# Patient Record
Sex: FEMALE | Race: WHITE
[De-identification: names, ages, dates, MRNs, and addresses within clinical notes are randomized per-mention and may not be internally consistent; named-entity substitution may affect disease eponyms.]

---

## 2019-06-24 ENCOUNTER — HOSPITAL ENCOUNTER (EMERGENCY)
Dept: HOSPITAL 74 - FER | Age: 29
Discharge: HOME | End: 2019-06-24
Payer: SELF-PAY

## 2020-01-15 ENCOUNTER — HOSPITAL ENCOUNTER (EMERGENCY)
Dept: HOSPITAL 74 - FER | Age: 30
Discharge: HOME | End: 2020-01-15
Payer: COMMERCIAL

## 2020-01-15 VITALS — BODY MASS INDEX: 31.1 KG/M2

## 2020-01-15 VITALS — DIASTOLIC BLOOD PRESSURE: 76 MMHG | HEART RATE: 94 BPM | TEMPERATURE: 99.8 F | SYSTOLIC BLOOD PRESSURE: 116 MMHG

## 2020-01-15 DIAGNOSIS — J06.9: Primary | ICD-10-CM

## 2020-01-15 DIAGNOSIS — R05: ICD-10-CM

## 2020-01-15 DIAGNOSIS — B97.89: ICD-10-CM

## 2020-01-15 DIAGNOSIS — F17.210: ICD-10-CM

## 2020-01-15 NOTE — PDOC
History of Present Illness





- General


Chief Complaint: Cold Symptoms


Stated Complaint: COUGH


Time Seen by Provider: 01/15/20 09:45





- History of Present Illness


Initial Comments: 





01/15/20 12:07


Chief complaint: Fever, body aches, malaise, fatigue, nonproductive cough, 

nausea, vomiting, diarrhea for 3 days.  Taking over-the-counter flu remedies





HPI: As above.





Review of systems: No hematemesis, melena, bloody stool.  Holding down p.o. 

food and fluids.   with similar illness which has resolved.  No flu shot 

was received





Past medical history: Healthy female, no active medical or surgical problems 

other than prior drug abuse, now on Suboxone, no recent drug use





Social history: As noted in the past medical history.  Otherwise negative





Family history: Negative





Physical exam: Alert and oriented well-developed well-nourished no acute 

distress cooperative


Afebrile, vital signs normal


HEENT normal


Neck supple without bruit mass or nodes


Chest clear with full breath sounds bilaterally, no wheezes rales or rhonchi.  

No tachypnea or dyspnea.  No other respiratory distress


CV without murmur rub or gallop


Abdomen benign


Skin clear, no rash, adequate turgor and wet venous membranes


Extremities no CCE


Neurological intact





Impression: Likely influenza, resolving, no serious lung or GI involvement





Plan: Reassure, symptomatic treatment and follow-up primary physician.  Return 

to ER if worse.  Fully ambulatory and in no significant distress at discharge 

to follow-up as directed











Past History





- Past Medical History


Allergies/Adverse Reactions: 


 Allergies











Allergy/AdvReac Type Severity Reaction Status Date / Time


 


No Known Allergies Allergy   Verified 01/15/20 09:38











Home Medications: 


Ambulatory Orders





Buprenorphine/Naloxone [Suboxone 8Mg/2Mg Sl Film -] 1 each SL ASDIR 06/24/19 


Ibuprofen 600 mg PO TID #15 tablet 01/15/20 


Promethazine/Dextromethorphan [Promethazine-Dm Syrup] 2 tsp PO TID PRN #120 ml 

01/15/20 








COPD: No





- Immunization History


Td Vaccination: Yes


Immunization Up to Date: Yes





- Psycho Social/Smoking Cessation Hx


Smoking Status: No


Smoking History: Current every day smoker


Years of Tobacco Use: 0


Have you smoked in the past 12 months: Yes


Number of Cigarettes Smoked Daily: 10


Cigars Per Day: 0


Information on smoking cessation initiated: Yes


'Breaking Loose' booklet given: 06/24/19


Hx Alcohol Use: Yes (OCASIONAL)


Drug/Substance Use Hx: No


Substance Use Type: None





*Physical Exam





- Vital Signs


 Last Vital Signs











Temp Pulse Resp BP Pulse Ox


 


 99.8 F H  94 H  18   116/76   100 


 


 01/15/20 09:34  01/15/20 09:34  01/15/20 09:34  01/15/20 09:34  01/15/20 09:34














Discharge





- Discharge Information


Problems reviewed: Yes


Clinical Impression/Diagnosis: 


 Viral URI with cough





Condition: Stable


Disposition: HOME





- Admission


No





- Additional Discharge Information


Prescriptions: 


Ibuprofen 600 mg PO TID #15 tablet


Promethazine/Dextromethorphan [Promethazine-Dm Syrup] 2 tsp PO TID PRN #120 ml


 PRN Reason: Cough





- Follow up/Referral





- Patient Discharge Instructions


Patient Printed Discharge Instructions:  DI for Viral Upper Respiratory 

Infection -- Adult


Additional Instructions: 


Rest, fluids, medication as needed for fever, body aches, and cough





Return to ER if there is severe chest pain, shortness of breath, vomiting, 

diarrhea, or high fever.  Otherwise follow-up primary physician 2 to 3 days





- Post Discharge Activity


Work/Back to School Note:  Back to Work

## 2021-04-29 ENCOUNTER — HOSPITAL ENCOUNTER (INPATIENT)
Dept: HOSPITAL 74 - JDEL | Age: 31
LOS: 4 days | Discharge: HOME | DRG: 540 | End: 2021-05-03
Attending: OBSTETRICS & GYNECOLOGY | Admitting: OBSTETRICS & GYNECOLOGY
Payer: COMMERCIAL

## 2021-04-29 VITALS — BODY MASS INDEX: 43 KG/M2

## 2021-04-29 DIAGNOSIS — Z3A.37: ICD-10-CM

## 2021-04-29 DIAGNOSIS — F17.210: ICD-10-CM

## 2021-04-29 DIAGNOSIS — O32.4XX0: ICD-10-CM

## 2021-04-29 DIAGNOSIS — E66.01: ICD-10-CM

## 2021-04-29 PROCEDURE — U0005 INFEC AGEN DETEC AMPLI PROBE: HCPCS

## 2021-04-29 PROCEDURE — U0003 INFECTIOUS AGENT DETECTION BY NUCLEIC ACID (DNA OR RNA); SEVERE ACUTE RESPIRATORY SYNDROME CORONAVIRUS 2 (SARS-COV-2) (CORONAVIRUS DISEASE [COVID-19]), AMPLIFIED PROBE TECHNIQUE, MAKING USE OF HIGH THROUGHPUT TECHNOLOGIES AS DESCRIBED BY CMS-2020-01-R: HCPCS

## 2021-04-29 PROCEDURE — C9803 HOPD COVID-19 SPEC COLLECT: HCPCS

## 2021-04-30 LAB
AMPHET UR-MCNC: NEGATIVE NG/ML
ANION GAP SERPL CALC-SCNC: 11 MMOL/L (ref 8–16)
APTT BLD: 26.5 SECONDS (ref 25.2–36.5)
BARBITURATES UR-MCNC: NEGATIVE NG/ML
BASE EXCESS BLDCOA CALC-SCNC: -3.2 MMOL/L (ref 0–2)
BASE EXCESS BLDCOA CALC-SCNC: -4.5 MMOL/L (ref 0–2)
BASOPHILS # BLD: 0.5 % (ref 0–2)
BENZODIAZ UR SCN-MCNC: NEGATIVE NG/ML
BUN SERPL-MCNC: 11.9 MG/DL (ref 7–18)
CALCIUM SERPL-MCNC: 8.5 MG/DL (ref 8.5–10.1)
CHLORIDE SERPL-SCNC: 107 MMOL/L (ref 98–107)
CO2 SERPL-SCNC: 23 MMOL/L (ref 21–32)
COCAINE UR-MCNC: NEGATIVE NG/ML
CREAT SERPL-MCNC: 0.5 MG/DL (ref 0.55–1.3)
DEPRECATED RDW RBC AUTO: 15.1 % (ref 11.6–15.6)
EOSINOPHIL # BLD: 0.7 % (ref 0–4.5)
GLUCOSE SERPL-MCNC: 79 MG/DL (ref 74–106)
HCO3 BLDCO-SCNC: 23.5 MMHG (ref 20–29)
HCO3 BLDCO-SCNC: 25.7 MMHG (ref 20–29)
HCT VFR BLD CALC: 36.9 % (ref 32.4–45.2)
HGB BLD-MCNC: 11.8 GM/DL (ref 10.7–15.3)
INR BLD: 1.03 (ref 0.83–1.09)
LYMPHOCYTES # BLD: 16.4 % (ref 8–40)
MCH RBC QN AUTO: 28.5 PG (ref 25.7–33.7)
MCHC RBC AUTO-ENTMCNC: 32 G/DL (ref 32–36)
MCV RBC: 89 FL (ref 80–96)
METHADONE UR-MCNC: NEGATIVE NG/ML
MONOCYTES # BLD AUTO: 4.8 % (ref 3.8–10.2)
NEUTROPHILS # BLD: 77.6 % (ref 42.8–82.8)
OPIATES UR QL SCN: NEGATIVE NG/ML
PCO2 BLDCO: 55.1 MMHG (ref 30–78)
PCO2 BLDCO: 63.3 MMHG (ref 30–78)
PCP UR QL SCN: NEGATIVE NG/ML
PH BLDCO: 7.23 [PH] (ref 7.14–7.44)
PH BLDCO: 7.25 [PH] (ref 7.14–7.44)
PLATELET # BLD AUTO: 224 K/MM3 (ref 134–434)
PMV BLD: 10.1 FL (ref 7.5–11.1)
PT PNL PPP: 12.4 SEC (ref 9.7–13)
RBC # BLD AUTO: 4.14 M/MM3 (ref 3.6–5.2)
SODIUM SERPL-SCNC: 141 MMOL/L (ref 136–145)
WBC # BLD AUTO: 9.7 K/MM3 (ref 4–10)

## 2021-04-30 PROCEDURE — 10H073Z INSERTION OF MONITORING ELECTRODE INTO PRODUCTS OF CONCEPTION, VIA NATURAL OR ARTIFICIAL OPENING: ICD-10-PCS | Performed by: OBSTETRICS & GYNECOLOGY

## 2021-04-30 RX ADMIN — AMPICILLIN SCH GM: 1 INJECTION, POWDER, FOR SOLUTION INTRAMUSCULAR; INTRAVENOUS at 16:55

## 2021-04-30 RX ADMIN — IBUPROFEN PRN MG: 800 INJECTION INTRAVENOUS at 19:35

## 2021-04-30 RX ADMIN — BUPIVACAINE HYDROCHLORIDE SCH ML: 7.5 INJECTION, SOLUTION EPIDURAL; RETROBULBAR at 10:35

## 2021-04-30 RX ADMIN — AMPICILLIN SCH GM: 1 INJECTION, POWDER, FOR SOLUTION INTRAMUSCULAR; INTRAVENOUS at 04:25

## 2021-04-30 RX ADMIN — AMPICILLIN SCH GM: 1 INJECTION, POWDER, FOR SOLUTION INTRAMUSCULAR; INTRAVENOUS at 08:30

## 2021-04-30 RX ADMIN — AMPICILLIN SCH GM: 1 INJECTION, POWDER, FOR SOLUTION INTRAMUSCULAR; INTRAVENOUS at 12:30

## 2021-05-01 LAB
BASOPHILS # BLD: 0.3 % (ref 0–2)
DEPRECATED RDW RBC AUTO: 15 % (ref 11.6–15.6)
EOSINOPHIL # BLD: 0.5 % (ref 0–4.5)
HCT VFR BLD CALC: 28 % (ref 32.4–45.2)
HGB BLD-MCNC: 9 GM/DL (ref 10.7–15.3)
LYMPHOCYTES # BLD: 20 % (ref 8–40)
MCH RBC QN AUTO: 29 PG (ref 25.7–33.7)
MCHC RBC AUTO-ENTMCNC: 32.3 G/DL (ref 32–36)
MCV RBC: 89.8 FL (ref 80–96)
MONOCYTES # BLD AUTO: 5.9 % (ref 3.8–10.2)
NEUTROPHILS # BLD: 73.3 % (ref 42.8–82.8)
PLATELET # BLD AUTO: 172 K/MM3 (ref 134–434)
PMV BLD: 10.2 FL (ref 7.5–11.1)
RBC # BLD AUTO: 3.11 M/MM3 (ref 3.6–5.2)
WBC # BLD AUTO: 9.4 K/MM3 (ref 4–10)

## 2021-05-01 RX ADMIN — ENOXAPARIN SODIUM SCH MG: 40 INJECTION SUBCUTANEOUS at 10:43

## 2021-05-01 RX ADMIN — ACETAMINOPHEN PRN MG: 325 TABLET ORAL at 20:07

## 2021-05-01 RX ADMIN — CEFAZOLIN SODIUM SCH MLS/HR: 1 INJECTION, SOLUTION INTRAVENOUS at 10:00

## 2021-05-01 RX ADMIN — IBUPROFEN PRN MG: 600 TABLET, FILM COATED ORAL at 14:49

## 2021-05-01 RX ADMIN — SIMETHICONE CHEW TAB 80 MG PRN MG: 80 TABLET ORAL at 20:09

## 2021-05-01 RX ADMIN — ACETAMINOPHEN PRN MG: 325 TABLET ORAL at 14:50

## 2021-05-01 RX ADMIN — IBUPROFEN PRN MG: 800 INJECTION INTRAVENOUS at 08:53

## 2021-05-01 RX ADMIN — SIMETHICONE CHEW TAB 80 MG PRN MG: 80 TABLET ORAL at 14:50

## 2021-05-01 RX ADMIN — IBUPROFEN PRN MG: 800 INJECTION INTRAVENOUS at 01:36

## 2021-05-01 RX ADMIN — IBUPROFEN PRN MG: 600 TABLET, FILM COATED ORAL at 20:05

## 2021-05-01 RX ADMIN — BUPIVACAINE HYDROCHLORIDE SCH: 7.5 INJECTION, SOLUTION EPIDURAL; RETROBULBAR at 21:20

## 2021-05-01 RX ADMIN — CEFAZOLIN SODIUM SCH MLS/HR: 1 INJECTION, SOLUTION INTRAVENOUS at 02:25

## 2021-05-02 RX ADMIN — SIMETHICONE CHEW TAB 80 MG PRN MG: 80 TABLET ORAL at 23:49

## 2021-05-02 RX ADMIN — IBUPROFEN PRN MG: 600 TABLET, FILM COATED ORAL at 01:01

## 2021-05-02 RX ADMIN — IBUPROFEN PRN MG: 600 TABLET, FILM COATED ORAL at 23:48

## 2021-05-02 RX ADMIN — ENOXAPARIN SODIUM SCH MG: 40 INJECTION SUBCUTANEOUS at 09:06

## 2021-05-02 RX ADMIN — ACETAMINOPHEN PRN MG: 325 TABLET ORAL at 01:02

## 2021-05-02 RX ADMIN — IBUPROFEN PRN MG: 600 TABLET, FILM COATED ORAL at 07:24

## 2021-05-02 RX ADMIN — SIMETHICONE CHEW TAB 80 MG PRN MG: 80 TABLET ORAL at 20:33

## 2021-05-02 RX ADMIN — ACETAMINOPHEN PRN MG: 325 TABLET ORAL at 15:57

## 2021-05-02 RX ADMIN — SIMETHICONE CHEW TAB 80 MG PRN MG: 80 TABLET ORAL at 12:31

## 2021-05-02 RX ADMIN — DOCUSATE SODIUM,SENNOSIDES PRN TABLET: 50; 8.6 TABLET, FILM COATED ORAL at 01:02

## 2021-05-02 RX ADMIN — ACETAMINOPHEN PRN MG: 325 TABLET ORAL at 20:34

## 2021-05-02 RX ADMIN — IBUPROFEN PRN MG: 600 TABLET, FILM COATED ORAL at 12:31

## 2021-05-02 RX ADMIN — DOCUSATE SODIUM,SENNOSIDES PRN TABLET: 50; 8.6 TABLET, FILM COATED ORAL at 20:33

## 2021-05-02 RX ADMIN — ACETAMINOPHEN PRN MG: 325 TABLET ORAL at 12:31

## 2021-05-02 RX ADMIN — IBUPROFEN PRN MG: 600 TABLET, FILM COATED ORAL at 20:33

## 2021-05-03 VITALS — HEART RATE: 90 BPM | SYSTOLIC BLOOD PRESSURE: 121 MMHG | DIASTOLIC BLOOD PRESSURE: 64 MMHG | TEMPERATURE: 97.8 F

## 2021-05-03 LAB
BASOPHILS # BLD: 0.3 % (ref 0–2)
DEPRECATED RDW RBC AUTO: 15.3 % (ref 11.6–15.6)
EOSINOPHIL # BLD: 2.5 % (ref 0–4.5)
HCT VFR BLD CALC: 28.7 % (ref 32.4–45.2)
HGB BLD-MCNC: 9.5 GM/DL (ref 10.7–15.3)
LYMPHOCYTES # BLD: 32.3 % (ref 8–40)
MCH RBC QN AUTO: 29.7 PG (ref 25.7–33.7)
MCHC RBC AUTO-ENTMCNC: 33.2 G/DL (ref 32–36)
MCV RBC: 89.4 FL (ref 80–96)
MONOCYTES # BLD AUTO: 3.4 % (ref 3.8–10.2)
NEUTROPHILS # BLD: 61.5 % (ref 42.8–82.8)
PLATELET # BLD AUTO: 187 K/MM3 (ref 134–434)
PMV BLD: 9.3 FL (ref 7.5–11.1)
RBC # BLD AUTO: 3.21 M/MM3 (ref 3.6–5.2)
WBC # BLD AUTO: 7 K/MM3 (ref 4–10)

## 2021-05-03 RX ADMIN — ENOXAPARIN SODIUM SCH MG: 40 INJECTION SUBCUTANEOUS at 09:12

## 2021-05-03 RX ADMIN — IBUPROFEN PRN MG: 600 TABLET, FILM COATED ORAL at 06:48

## 2021-05-03 RX ADMIN — ACETAMINOPHEN PRN MG: 325 TABLET ORAL at 06:48

## 2021-05-03 RX ADMIN — SIMETHICONE CHEW TAB 80 MG PRN MG: 80 TABLET ORAL at 06:48

## 2023-01-08 ENCOUNTER — HOSPITAL ENCOUNTER (EMERGENCY)
Dept: HOSPITAL 74 - FER | Age: 33
Discharge: HOME | End: 2023-01-08
Payer: COMMERCIAL

## 2023-01-08 VITALS
RESPIRATION RATE: 18 BRPM | HEART RATE: 98 BPM | SYSTOLIC BLOOD PRESSURE: 111 MMHG | TEMPERATURE: 99 F | DIASTOLIC BLOOD PRESSURE: 73 MMHG

## 2023-01-08 VITALS — BODY MASS INDEX: 26.7 KG/M2

## 2023-01-08 DIAGNOSIS — M54.50: ICD-10-CM

## 2023-01-08 DIAGNOSIS — N83.291: Primary | ICD-10-CM

## 2023-01-08 LAB
ALBUMIN SERPL-MCNC: 3.6 G/DL (ref 3.4–5)
ALP SERPL-CCNC: 64 U/L (ref 45–117)
ALT SERPL-CCNC: 10 U/L (ref 13–61)
ANION GAP SERPL CALC-SCNC: 7 MMOL/L (ref 8–16)
AST SERPL-CCNC: 13 U/L (ref 15–37)
BILIRUB SERPL-MCNC: 0.5 MG/DL (ref 0.2–1)
BUN SERPL-MCNC: 9 MG/DL (ref 7–18)
CALCIUM SERPL-MCNC: 8.6 MG/DL (ref 8.5–10)
CHLORIDE SERPL-SCNC: 104 MMOL/L (ref 98–107)
CO2 SERPL-SCNC: 25 MMOL/L (ref 21–32)
CREAT SERPL-MCNC: 0.6 MG/DL (ref 0.55–1.3)
DEPRECATED RDW RBC AUTO: 14 % (ref 11.6–15.6)
GLUCOSE SERPL-MCNC: 87 MG/DL (ref 74–106)
HCT VFR BLD CALC: 35.5 % (ref 32.4–45.2)
HGB BLD-MCNC: 12.1 G/DL (ref 10.7–15.3)
MCH RBC QN AUTO: 31.1 PG (ref 25.7–33.7)
MCHC RBC AUTO-ENTMCNC: 34.2 G/DL (ref 32–36)
MCV RBC: 91.1 FL (ref 80–96)
PLATELET # BLD AUTO: 210 10^3/UL (ref 134–434)
PMV BLD: 8.9 FL (ref 7.5–11.1)
PROT SERPL-MCNC: 6.6 G/DL (ref 6.4–8.2)
RBC # BLD AUTO: 3.9 10^6/UL (ref 3.6–5.2)
SODIUM SERPL-SCNC: 136 MMOL/L (ref 136–145)
WBC # BLD AUTO: 5.7 10^3/UL (ref 4–10.8)

## 2023-01-08 PROCEDURE — 3E0333Z INTRODUCTION OF ANTI-INFLAMMATORY INTO PERIPHERAL VEIN, PERCUTANEOUS APPROACH: ICD-10-PCS | Performed by: EMERGENCY MEDICINE

## 2023-01-08 PROCEDURE — 3E0337Z INTRODUCTION OF ELECTROLYTIC AND WATER BALANCE SUBSTANCE INTO PERIPHERAL VEIN, PERCUTANEOUS APPROACH: ICD-10-PCS | Performed by: EMERGENCY MEDICINE
